# Patient Record
Sex: FEMALE | Race: OTHER | Employment: OTHER | URBAN - METROPOLITAN AREA
[De-identification: names, ages, dates, MRNs, and addresses within clinical notes are randomized per-mention and may not be internally consistent; named-entity substitution may affect disease eponyms.]

---

## 2023-01-09 ENCOUNTER — EMERGENCY VISIT (OUTPATIENT)
Dept: URBAN - METROPOLITAN AREA CLINIC 27 | Facility: CLINIC | Age: 23
End: 2023-01-09

## 2023-01-09 DIAGNOSIS — H18.821: ICD-10-CM

## 2023-01-09 PROCEDURE — 99213 OFFICE O/P EST LOW 20 MIN: CPT

## 2023-01-09 RX ORDER — NEOMYCIN SULFATE, POLYMYXIN B SULFATE AND DEXAMETHASONE 3.5; 10000; 1 MG/ML; [USP'U]/ML; MG/ML: 1 SUSPENSION OPHTHALMIC

## 2023-01-09 ASSESSMENT — VISUAL ACUITY
OD_SC: 20/200
OS_CC: 20/20
OD_PH: 20/50

## 2023-01-09 ASSESSMENT — TONOMETRY
OD_IOP_MMHG: 23
OD_IOP_MMHG: 18

## 2024-02-06 ENCOUNTER — HOSPITAL ENCOUNTER (EMERGENCY)
Facility: CLINIC | Age: 24
Discharge: HOME | End: 2024-02-06
Attending: PHYSICIAN ASSISTANT | Admitting: PHYSICIAN ASSISTANT
Payer: COMMERCIAL

## 2024-02-06 VITALS
HEART RATE: 88 BPM | WEIGHT: 132 LBS | BODY MASS INDEX: 18.9 KG/M2 | TEMPERATURE: 98.4 F | DIASTOLIC BLOOD PRESSURE: 102 MMHG | HEIGHT: 70 IN | RESPIRATION RATE: 16 BRPM | SYSTOLIC BLOOD PRESSURE: 132 MMHG | OXYGEN SATURATION: 98 %

## 2024-02-06 DIAGNOSIS — S61.411A LACERATION OF RIGHT HAND WITHOUT FOREIGN BODY, INITIAL ENCOUNTER: ICD-10-CM

## 2024-02-06 PROCEDURE — 90471 IMMUNIZATION ADMIN: CPT | Performed by: PHYSICIAN ASSISTANT

## 2024-02-06 PROCEDURE — 12001 RPR S/N/AX/GEN/TRNK 2.5CM/<: CPT

## 2024-02-06 PROCEDURE — 250N000011 HC RX IP 250 OP 636: Performed by: PHYSICIAN ASSISTANT

## 2024-02-06 PROCEDURE — 99284 EMERGENCY DEPT VISIT MOD MDM: CPT

## 2024-02-06 PROCEDURE — 90715 TDAP VACCINE 7 YRS/> IM: CPT | Performed by: PHYSICIAN ASSISTANT

## 2024-02-06 RX ADMIN — CLOSTRIDIUM TETANI TOXOID ANTIGEN (FORMALDEHYDE INACTIVATED), CORYNEBACTERIUM DIPHTHERIAE TOXOID ANTIGEN (FORMALDEHYDE INACTIVATED), BORDETELLA PERTUSSIS TOXOID ANTIGEN (GLUTARALDEHYDE INACTIVATED), BORDETELLA PERTUSSIS FILAMENTOUS HEMAGGLUTININ ANTIGEN (FORMALDEHYDE INACTIVATED), BORDETELLA PERTUSSIS PERTACTIN ANTIGEN, AND BORDETELLA PERTUSSIS FIMBRIAE 2/3 ANTIGEN 0.5 ML: 5; 2; 2.5; 5; 3; 5 INJECTION, SUSPENSION INTRAMUSCULAR at 22:52

## 2024-02-07 NOTE — ED TRIAGE NOTES
Pt was holding a glass container that punctured hand causing laceration    1-2 inch lac on top of left hand. Cleaned well by patient. Bleeding controlled in triage. Pressure dressing and new gauze applied.     Unknown tetanus status.

## 2024-02-07 NOTE — ED PROVIDER NOTES
"  History     Chief Complaint:  Laceration       The history is provided by the patient.      Matias Keller is a 23 year old female who presents to the ED for a laceration. The patient reports around 2045 she was holding a glass container that broke in half in her hand and lacerated her right hand. She confirms her sensation is intact denies numbness to hand or fingers. She denies glass shards getting in wound but rather just cut it on sharp edge just PTA.  Can move her fingers normally. She confirms she is okay with updating her tetanus vaccine today.  She is right handed.    Independent Historian:   None - Patient Only    Review of External Notes:   none       Medications:    Tri-Linyah   Ibuprofen    Past Medical History:    Secondary amenorrhea   Sprain of UCL of right elbow   Developmental dislocation of joint, pelvic region and thigh     Physical Exam   Patient Vitals for the past 24 hrs:   BP Temp Temp src Pulse Resp SpO2 Height Weight   02/06/24 2148 (!) 132/102 98.4  F (36.9  C) Oral 88 16 98 % 1.778 m (5' 10\") 59.9 kg (132 lb)        Physical Exam  General: Alert and oriented.    Head: Normocephalic.  External ears and nose normal.    Eyes: Pupils equal and round.  Normal tracking.    Pulmonary/Chest: Effort and rate normal    MSK:  Normal ROM in MCP, PIP, DIP joints.    SKIN: 2 cm laceration to dorsal right hand over second metacarpal area.  No tendon disruption foreign body or bleeding vessel on exam.  Otherwise warm and dry with strong radial pulse and cap refill <2 seconds    NEURO:  Normal strength of flexion and extension at MCP, PIP, and DIP joints.  Normal sensation to touch BL in fingers.    PSYCH:  Normal affect      Emergency Department Course   Procedures     Laceration Repair      Procedure: Laceration Repair    Indication: Laceration    Consent: Verbal    Location: Right Hand     Length: 2 cm    Preparation: Irrigation with Sterile Saline.    Anesthesia/Sedation: Lidocaine with Epinephrine " - 1%      Treatment/Exploration: Wound explored, no foreign bodies found     Closure: The wound was closed with one layer. Skin/superficial layer was closed with 5 x 4-0 Nylon using Interrupted sutures.     Patient Status: The patient tolerated the procedure well: Yes. There were no complications.    Emergency Department Course & Assessments:           Interventions:  Medications   Tdap (tetanus-diphtheria-acell pertussis) (ADACEL) injection 0.5 mL (has no administration in time range)            Independent Interpretation (X-rays, CTs, rhythm strip):  None    Assessments/Consultations/Discussion of Management or Tests:  ED Course as of 02/06/24 2252 Tue Feb 06, 2024 2215 I obtained history and examined the patient as noted above.    2223 I numbed the patient's area of trauma.   2230 I performed a laceration repair.        Social Determinants of Health affecting care:   None    Disposition:  The patient was discharged.     Impression & Plan    Medical Decision Making:  Matias Keller is a 23 year old female who presents with laceration to hand.  Patient history and records reviewed.  On examination, the patient has a laceration, but is otherwise well appearing.  There is no evidence of neurovascular compromise, fracture, imbedded foreign body, or tendon injury.  Wound was anesthetized, irrigated, explored, and closed as above with non-absorbale sutures.  Tetanus checked and updated.  Discussed indications to return to ED if new or worsening symptoms, or signs of infection such as fever, swelling, spreading erythema, drainage, or increasing pain.  Advised sun protection to reduce scarring.  Follow-up with primary care provider in 12-14 days for suture removal.  AlumaFoam splint provided to help prevent wound dehiscence.      Diagnosis:    ICD-10-CM    1. Laceration of right hand without foreign body, initial encounter  S61.411A            Discharge Medications:  New Prescriptions    No medications on file           Scribe Disclosure:  I, Lydia Garzatara, am serving as a scribe at 10:08 PM on 2/6/2024 to document services personally performed by Jonah Denise, * based on my observations and the provider's statements to me.     2/6/2024   Jonah Denise, *        Jonah Denise, CARLOS  02/06/24 8857

## (undated) RX ORDER — LIDOCAINE HYDROCHLORIDE AND EPINEPHRINE 10; 10 MG/ML; UG/ML
INJECTION, SOLUTION INFILTRATION; PERINEURAL
Status: DISPENSED
Start: 2024-02-06